# Patient Record
(demographics unavailable — no encounter records)

---

## 2024-11-14 NOTE — ASSESSMENT
[FreeTextEntry1] : #Hidradenitis Suppurativa, groin & abdominal pannus - chronic; focal flare.  Overall improved than baseline - Diagnosis, chronic nature, disease course, treatment options and goals of therapy discussed - Continue benzoyl peroxide 5% wash daily in the shower (alternating with Hibiclens wash) - Continue mupirocin ointment BID PRN - Continue spironolactone 100 mg nightly. SED - Continue silicone gel (or aquaphor) as barrier protectant to skin folds - 1 lesion injected in R inguinal fold Patient verbally consented. The risks/benefits/alternatives of intralesional injections were reviewed with the patient which include but are not limited to bleeding, pain, skin atrophy, and dyschromia (lightening of skin). Discussed possible lack of treatment efficacy and need for repeat treatments.  Site confirmed. Area prepped with alcohol.  Intralesional kenalog 10 mg/cc x 0.1cc injected today.  Discussed wound care instructions. Patient tolerated well with no immediate complications.   #EIC, R labia majora - Benign condition discussed with patient - No further treatment indicated at this time   #Medication Monitoring Encounter - pt is concurrently on antihypertensives. Advised pt to closely monitor BP while on spironolactone - Normal CMP in July 2024 - Counseled patient on side effects of breast tenderness or enlargement, menstrual irregularity/spotting, increased urination, dizziness/lightheadedness, and rarely hyperkalemia (0.72%). Discussed with patient to eat well-balanced diet and avoid excessive intake of high-potassium containing foods. Women on spironolactone should be on contraceptives as spironolactone rarely can cause birth defects. We have discussed that pregnancy may result in fetal abnormalities while on this medication and 3mo after completing course, and that patient must take adequate measures to eliminate risk of conceiving or participation in conceiving while on this medication. Discussed off-label use, including black box warning for tumor formation. No personal hx of breast cancer. Expect 3-6 mos to see clinical effect and need to continue medication for sustained effect. Patient expressed verbal understanding of these risks.  #Actinic Keratoses x1, nasal tip - Patient was verbally consented and the lesions identified above were treated with liquid nitrogen freeze, thaw, freeze x 10 seconds each cycle x 2. Side effects include blister formation, hypopigmentation, and scarring. The patient tolerated the procedure well.  Wound care instructions, care of a blister with vaseline, signs and symptoms of infection were discussed in full.  The patient denies any questions at this time.  #Seborrheic Keratosis, L cheek - These growths are benign - Related to genetics - these lesions run in families; NOT related to sun exposure - No treatment warranted unless inflamed; can use OTC Sarna lotion PRN itch  #Multiple melanocytic nevi, benign  - Monitor moles for changes  - Recommend wearing hats and sun protective clothing or OTC sunscreen products (SPF 30+, broad band, EltaMD/La Roche Posay/Neutrogena) daily on your face and entire body (apply sunscreen atleast 30 minutes prior to going outside). Reapply sunscreen every 2 hours when outside.  #Screening exam for skin cancer - no suspicious lesions on exam today - TBSE performed today - Advised sun protection.  Recommended OTC sunscreen products (EltaMD/Neutrogena/La Roche Posay), including SPF30+ with broadband UV protection as well as proper use.  Discussed OTC sun protective clothing - Counseled patient to monitor for changes, including mole monitoring and self-skin exams

## 2024-11-14 NOTE — HISTORY OF PRESENT ILLNESS
[FreeTextEntry1] : boils; skin check [de-identified] : Ms. SANDEEP MONCADA is a 49 year old F with HTN here for evaluation of below.   #Boils under abdomen and groin, xyears, on/off. Using hibiclens, clindamycin and mupirocin now. Prev tx with ILK, which is the only thing that helps per pt.  Flares with periods, which are becoming irregular (perimenopausal) - 10/20/22: started spironolactone 50mg qhs - 11/1/22: here for repeat ILK for new lesion under R abdomen and repeat for L groin. Painful and draining - 1/2023: stable with hibiclens and PRN triamcinolone during flares. Tolerating spironolactone - 2/28/2023: flaring; 3 spots under abdomen and groin. Taking spironolactone 50mg qhs, and tried mupirocin ointment.  - 3/28/23: flaring on bl inguinal folds 3 weeks ago; taking spironolactone 100mg qhs since LV. Partially helping, tolerating.  Pt notes ILK40 has worked better in the past - 4/24/23: pt thinks she is flaring on R inguinal fold, mildly. She notes that she is pending heme workup for elevated inflammatory markers; rheumatology workup negative so far per pt - 11/9/23: overall doing well with spironolactone and mupirocin PRN. Has 1 flaring cyst on R inguinal fold, betamethasone helped - 11/2024: flaring on bl inguinal folds, due for period but perimenopausal. Tolerating spironolactone; lowered losartan dose to half the dose.   #Rough spot on nasal tip #FBSE.  Spots scattered on body x years. Asymptomatic and unchanged. No alleviating/aggravating factors. Never been treated.  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  to partner Kristen who is also my patient

## 2024-11-14 NOTE — PHYSICAL EXAM
[Alert] : alert [Oriented x 3] : ~L oriented x 3 [Well Nourished] : well nourished [Conjunctiva Non-injected] : conjunctiva non-injected [No Visual Lymphadenopathy] : no visual  lymphadenopathy [No Clubbing] : no clubbing [No Edema] : no edema [No Bromhidrosis] : no bromhidrosis [No Chromhidrosis] : no chromhidrosis [Full Body Skin Exam Performed] : performed [FreeTextEntry3] : General: Alert and oriented, in NAD.  All of the following were examined and were within normal limits, except as noted:   Scalp: Face, including eyelids, nose, lips, ears, oropharynx: Neck: Chest/Back/Abdomen: Bilateral Arms/Hands: Bilateral Legs/Feet: Buttocks, Genitalia, Anus/perineum:  	 Hair, Oral Mucosa, Eyes:  Nails with polish, not examined  pink gritty papule on nasal tip pink follicular papule R inguinal fold cystic nodule with punctum R labia majora external atrophic pink scars under abdomen, mons, and inguinal folds.  numerous brown homogenous macules and papules on body

## 2025-01-29 NOTE — ASSESSMENT
[FreeTextEntry1] : Target: HbA1c < 7%, BP < 130/80  HbA1c is at goal. The patient has iron deficiency and follows up with Hematology for this - iron deficiency falsely elevates the HbA1c level so I will also monitor her serum fructosamine levels for now. BP is at goal.  Patient is on statin and ARB - no indication for Aspirin.  THE PATIENT EXPRESSED INTEREST IN USING OZEMPIC TO PROMOTE WEIGHT LOSS BUT I ADVISED HER THAT SHE CANNOT USE THIS AS SHE HAD PANCREATITIS IN THE PAST. The patient is not interested in Bariatric surgery at this time.   Last lipid panel - Jan 2025 - LDL 86, Trig 264 Last HbA1c -01/25/2025 - 6.6% Last Vitamin B12 - Jan 2025 - N Last urine albumin panel - July 2024 - negative Last BMP/CMP - Jan 2025  - Cr N, AST N, ALT N Other pertinent labs - July 2024 - TSH N, Hb N   Plan: 1. Continue metformin 1000 mg po qhs (patient has nausea if she uses the metformin in the morning) 2. Continue Farxiga 10 mg po daily 3. Fingersticks to be done once daily - CGM not covered by insurance and patient could not afford the freestyle tamera. 4. Labs to be done in 6 months - CBC, CMP, HbA1c, serum fructosamine level  5. Follow up in 6 months to review results and meter.

## 2025-01-29 NOTE — HISTORY OF PRESENT ILLNESS
[FreeTextEntry1] : Problems: 1. DM type 2 2. Hypertension 3. Hyperlipidemia 4. Obesity 5. PCOS - defer management to gynecology - patient uses metformin - patient is not on hormonal contraception  NOTE - patient had pancreatitis in the past and patient also has Polycystic Ovarian syndrome.    DM type 2  1. Diagnosed in Feb 2022 HbA1c was 6.6 in Feb 2022 2. Meds: Metformin 1000 mg po qhs  (patient has nausea if she uses the metformin in the morning, no side effects - patient notices metformin ER in her stool but she does not have this problems with metformin immediate release) Farxiga 10 mg po daily- No UTIs or genital candidiasis PATIENT HAD PANCREATITIS (UNKNOWN ETIOLOGY) IN THE PAST SO SHE CANNOT USE GLP-IV AGONISTS 3. Fingersticks not done, no hypoglycemic unawareness 4. Not on Aspirin, on rosuvastatin 20 mg po daily, on losartan 50 mg po daily 5. Complications: No DM nephropathy (normal creatinine, neg urine microalbumin panel in July 2024) No DM retinopathy (last eye exam was in October 2024, patient advised on the need for annual DM eye exam) No ASCVD No foot ulcers/amputation 6. Patient never smoked cigarettes   Obesity 1. 01/29/2025 - Weight 306 pounds, BMI 54.21 2. PATIENT HAD PANCREATITIS (UNKNOWN ETIOLOGY) IN THE PAST SO SHE CANNOT USE GLP-IV AGONISTS 3. Patient is not interested in weight loss surgery at this time

## 2025-01-29 NOTE — PHYSICAL EXAM
[de-identified] : General: No distress, well nourished Eyes: Normal Sclera, EOMI, PERRL ENT: Normal appearance of the nose, normal oropharynx, normal dentition Neck/Thyroid: No cervical lymphadenopathy, thyroid gland 20 g in size, no thyroid nodules, non-tender Respiratory: No use of accessory muscles of respiration, vesicular breath sounds heard bilaterally, no crepitations or ronchi Cardiovascular: S1 and S2 heard and normal, no S3 or S4, no murmurs, radial pulse normal bilaterally Abdomen: soft, non-tender, no masses, normal bowel sounds Musculoskeletal: No swelling or deformities of joints of hands, no pedal edema Neurological: Normal range of motion in the hands, Normal brachioradialis reflexes bilaterally Psychiatry: Patient converses normally, good judgement and insight Skin: No rashes in hands, no nodules palpated in hands  [de-identified] : I defer DM foot exam to podiatry

## 2025-06-03 NOTE — PROCEDURE
[Colposcopy] : Colposcopy  [Time out performed] : Pre-procedure time out performed.  Patient's name, date of birth and procedure confirmed. [Consent Obtained] : Consent obtained [Risks] : risks [Benefits] : benefits [Alternatives] : alternatives [Patient] : patient [Infection] : infection [Bleeding] : bleeding [Allergic Reaction] : allergic reaction [HPV High Risk] : HPV high risk [Colposcopy Adequate] : colposcopy adequate [ECC Performed] : ECC performed [No Abnormalities] : no abnormalities [Biopsy] : biopsy taken [Tolerated Well] : the patient tolerated the procedure well [SCI Fully Visualized] : SCI not fully visualized [Hemostasis Obtained] : Hemostasis obtained [de-identified] : 1 [de-identified] : 11 o'clock [de-identified] : acetowhite

## 2025-07-29 NOTE — HISTORY OF PRESENT ILLNESS
[FreeTextEntry1] : Problems: 1. DM type 2 2. Hypertension 3. Hyperlipidemia 4. Obesity 5. PCOS - defer management to gynecology - patient uses metformin - patient is not on hormonal contraception  NOTE - patient had pancreatitis in the past and patient also has Polycystic Ovarian syndrome.    DM type 2  1. Diagnosed in Feb 2022 HbA1c was 6.6 in Feb 2022 2. Meds: Metformin 1000 mg po qhs  (patient has nausea if she uses the metformin in the morning, no side effects - patient notices metformin ER in her stool but she does not have this problems with metformin immediate release) Farxiga 10 mg po daily- No UTIs or genital candidiasis PATIENT HAD PANCREATITIS (UNKNOWN ETIOLOGY) IN THE PAST SO SHE CANNOT USE GLP-IV AGONISTS 3. Fingersticks not done, no hypoglycemic unawareness 4. Not on Aspirin, on rosuvastatin 20 mg po daily, on losartan 25 mg po daily 5. Complications: No DM nephropathy (normal creatinine, neg urine microalbumin panel in July 2024) No DM retinopathy (last eye exam was in October 2024, patient advised on the need for annual DM eye exam) No ASCVD No foot ulcers/amputation 6. Patient never smoked cigarettes    Obesity 1. 01/29/2025 - Weight 306 pounds, BMI 54.21 2. PATIENT HAD PANCREATITIS (UNKNOWN ETIOLOGY) IN THE PAST SO SHE CANNOT USE GLP-IV AGONISTS 3. Patient is not interested in weight loss surgery at this time

## 2025-07-29 NOTE — PHYSICAL EXAM
[de-identified] : General: No distress, well nourished Eyes: Normal Sclera, EOMI, PERRL ENT: Normal appearance of the nose, normal oropharynx, normal dentition Neck/Thyroid: No cervical lymphadenopathy, thyroid gland 20 g in size, no thyroid nodules, non-tender Respiratory: No use of accessory muscles of respiration, vesicular breath sounds heard bilaterally, no crepitations or ronchi Cardiovascular: S1 and S2 heard and normal, no S3 or S4, no murmurs, radial pulse normal bilaterally Abdomen: soft, non-tender, no masses, normal bowel sounds Musculoskeletal: No swelling or deformities of joints of hands, no pedal edema Neurological: Normal range of motion in the hands, Normal brachioradialis reflexes bilaterally Psychiatry: Patient converses normally, good judgement and insight Skin: No rashes in hands, no nodules palpated in hands  [de-identified] : I defer DM foot exam to podiatry